# Patient Record
Sex: FEMALE | URBAN - NONMETROPOLITAN AREA
[De-identification: names, ages, dates, MRNs, and addresses within clinical notes are randomized per-mention and may not be internally consistent; named-entity substitution may affect disease eponyms.]

---

## 2023-10-04 ENCOUNTER — APPOINTMENT (OUTPATIENT)
Dept: URBAN - NONMETROPOLITAN AREA CLINIC 47 | Age: 23
Setting detail: DERMATOLOGY
End: 2023-10-11

## 2023-10-04 DIAGNOSIS — L259 CONTACT DERMATITIS AND OTHER ECZEMA, UNSPECIFIED CAUSE: ICD-10-CM

## 2023-10-04 PROBLEM — L23.9 ALLERGIC CONTACT DERMATITIS, UNSPECIFIED CAUSE: Status: ACTIVE | Noted: 2023-10-04

## 2023-10-04 PROCEDURE — OTHER COUNSELING: OTHER

## 2023-10-04 PROCEDURE — OTHER PRESCRIPTION MEDICATION MANAGEMENT: OTHER

## 2023-10-04 PROCEDURE — OTHER PRESCRIPTION: OTHER

## 2023-10-04 PROCEDURE — 99203 OFFICE O/P NEW LOW 30 MIN: CPT

## 2023-10-04 RX ORDER — TRIAMCINOLONE ACETONIDE 1 MG/G
CREAM TOPICAL
Qty: 80 | Refills: 1 | Status: ERX | COMMUNITY
Start: 2023-10-04

## 2023-10-04 ASSESSMENT — LOCATION DETAILED DESCRIPTION DERM
LOCATION DETAILED: LEFT DISTAL PRETIBIAL REGION
LOCATION DETAILED: RIGHT INFERIOR ANTERIOR NECK
LOCATION DETAILED: UPPER STERNUM
LOCATION DETAILED: LEFT AXILLARY VAULT
LOCATION DETAILED: RIGHT ANTERIOR AXILLA

## 2023-10-04 ASSESSMENT — LOCATION SIMPLE DESCRIPTION DERM
LOCATION SIMPLE: CHEST
LOCATION SIMPLE: RIGHT ANTERIOR NECK
LOCATION SIMPLE: RIGHT ANTERIOR AXILLA
LOCATION SIMPLE: LEFT PRETIBIAL REGION
LOCATION SIMPLE: LEFT AXILLARY VAULT

## 2023-10-04 ASSESSMENT — LOCATION ZONE DERM
LOCATION ZONE: AXILLAE
LOCATION ZONE: NECK
LOCATION ZONE: TRUNK
LOCATION ZONE: LEG

## 2023-10-04 NOTE — PROCEDURE: PRESCRIPTION MEDICATION MANAGEMENT
Render In Strict Bullet Format?: No
Initiate Treatment: Claritin 10mg QAM\\nBenadryl 25mg QHS\\nTriamcinolone 0.1% cream BID for 1-2 weeks
Discontinue Regimen: Detergent and spray deodorant
Detail Level: Zone

## 2023-11-08 ENCOUNTER — APPOINTMENT (OUTPATIENT)
Dept: URBAN - NONMETROPOLITAN AREA CLINIC 47 | Age: 23
Setting detail: DERMATOLOGY
End: 2023-11-14

## 2023-11-08 DIAGNOSIS — L259 CONTACT DERMATITIS AND OTHER ECZEMA, UNSPECIFIED CAUSE: ICD-10-CM

## 2023-11-08 PROBLEM — L23.9 ALLERGIC CONTACT DERMATITIS, UNSPECIFIED CAUSE: Status: ACTIVE | Noted: 2023-11-08

## 2023-11-08 PROCEDURE — OTHER COUNSELING: OTHER

## 2023-11-08 PROCEDURE — 99213 OFFICE O/P EST LOW 20 MIN: CPT

## 2023-11-08 PROCEDURE — OTHER PRESCRIPTION: OTHER

## 2023-11-08 PROCEDURE — OTHER PRESCRIPTION MEDICATION MANAGEMENT: OTHER

## 2023-11-08 RX ORDER — CLOBETASOL PROPIONATE 0.5 MG/G
OINTMENT TOPICAL
Qty: 60 | Refills: 3 | Status: ERX | COMMUNITY
Start: 2023-11-08

## 2023-11-08 ASSESSMENT — LOCATION ZONE DERM: LOCATION ZONE: AXILLAE

## 2023-11-08 ASSESSMENT — LOCATION SIMPLE DESCRIPTION DERM: LOCATION SIMPLE: LEFT POSTERIOR AXILLA

## 2023-11-08 ASSESSMENT — LOCATION DETAILED DESCRIPTION DERM: LOCATION DETAILED: LEFT POSTERIOR AXILLA

## 2023-11-08 NOTE — PROCEDURE: PRESCRIPTION MEDICATION MANAGEMENT
Plan: Can consider patch testing if symptoms persist
Discontinue Regimen: Triamcinolone 0.1% cream
Initiate Treatment: Clobetasol 0.05% ointment BID for 2-3 weeks
Detail Level: Zone
Render In Strict Bullet Format?: No

## 2023-12-29 ENCOUNTER — APPOINTMENT (OUTPATIENT)
Dept: URBAN - NONMETROPOLITAN AREA CLINIC 47 | Age: 23
Setting detail: DERMATOLOGY
End: 2023-12-29

## 2023-12-29 DIAGNOSIS — L259 CONTACT DERMATITIS AND OTHER ECZEMA, UNSPECIFIED CAUSE: ICD-10-CM

## 2023-12-29 PROBLEM — L23.9 ALLERGIC CONTACT DERMATITIS, UNSPECIFIED CAUSE: Status: ACTIVE | Noted: 2023-12-29

## 2023-12-29 PROCEDURE — OTHER ORDER FOR PATCH TESTING: OTHER

## 2023-12-29 PROCEDURE — OTHER PRESCRIPTION MEDICATION MANAGEMENT: OTHER

## 2023-12-29 PROCEDURE — OTHER COUNSELING: OTHER

## 2023-12-29 PROCEDURE — 99213 OFFICE O/P EST LOW 20 MIN: CPT

## 2023-12-29 ASSESSMENT — LOCATION DETAILED DESCRIPTION DERM
LOCATION DETAILED: RIGHT DISTAL PRETIBIAL REGION
LOCATION DETAILED: RIGHT RIB CAGE
LOCATION DETAILED: RIGHT MEDIAL SUPERIOR CHEST
LOCATION DETAILED: RIGHT INFERIOR ANTERIOR NECK
LOCATION DETAILED: LEFT INFERIOR ANTERIOR NECK
LOCATION DETAILED: LEFT ANTERIOR MEDIAL PROXIMAL UPPER ARM
LOCATION DETAILED: RIGHT AXILLARY TAIL OF BREAST
LOCATION DETAILED: LEFT MEDIAL SUPERIOR CHEST

## 2023-12-29 ASSESSMENT — LOCATION SIMPLE DESCRIPTION DERM
LOCATION SIMPLE: RIGHT ANTERIOR NECK
LOCATION SIMPLE: LEFT UPPER ARM
LOCATION SIMPLE: ABDOMEN
LOCATION SIMPLE: RIGHT PRETIBIAL REGION
LOCATION SIMPLE: LEFT ANTERIOR NECK
LOCATION SIMPLE: RIGHT BREAST
LOCATION SIMPLE: CHEST

## 2023-12-29 ASSESSMENT — LOCATION ZONE DERM
LOCATION ZONE: NECK
LOCATION ZONE: LEG
LOCATION ZONE: TRUNK
LOCATION ZONE: ARM

## 2023-12-29 ASSESSMENT — ITCH NUMERIC RATING SCALE: HOW SEVERE IS YOUR ITCHING?: 8

## 2023-12-29 ASSESSMENT — SEVERITY ASSESSMENT: SEVERITY: MILD TO MODERATE

## 2023-12-29 ASSESSMENT — BSA RASH: BSA RASH: 6

## 2023-12-29 NOTE — PROCEDURE: ORDER FOR PATCH TESTING
Patch Test To Be Applied: Core ACDS Recommended Series
Location Patches Should Be Applied: Back
Counseling: I discussed the timing of the procedure and ensured the patient understands that this test requires multiple visits. No topical steroids applied should be applied to the patch testing location and no oral prednisone for two week prior to the test. While the patches are in place they should be kept dry which will limit bathing, swimming an exercise. I also explained that it is common for testing to be negative and this doesn't mean there isn't a allergic reaction occurring. During the testing itching is common.
Detail Level: Simple
Patch Test Reading Schedule: First Reading at 48 hours and Second Reading at 96 hours

## 2023-12-29 NOTE — PROCEDURE: PRESCRIPTION MEDICATION MANAGEMENT
Detail Level: Zone
Continue Regimen: Clobetasol 0.05% ointment to affected areas, avoid axilla.
Render In Strict Bullet Format?: No

## 2024-01-29 ENCOUNTER — APPOINTMENT (OUTPATIENT)
Dept: URBAN - METROPOLITAN AREA CLINIC 216 | Age: 24
Setting detail: DERMATOLOGY
End: 2024-01-29

## 2024-01-29 DIAGNOSIS — L259 CONTACT DERMATITIS AND OTHER ECZEMA, UNSPECIFIED CAUSE: ICD-10-CM

## 2024-01-29 PROBLEM — L23.9 ALLERGIC CONTACT DERMATITIS, UNSPECIFIED CAUSE: Status: ACTIVE | Noted: 2024-01-29

## 2024-01-29 PROCEDURE — 95044 PATCH/APPLICATION TESTS: CPT

## 2024-01-29 PROCEDURE — OTHER MIPS QUALITY: OTHER

## 2024-01-29 PROCEDURE — OTHER PATCH TESTING: OTHER

## 2024-01-29 PROCEDURE — OTHER SMOKING CESSATION COUNSELING: OTHER

## 2024-01-29 NOTE — PROCEDURE: SMOKING CESSATION COUNSELING
Counseling Text: Counseled patient to stop smoking and explained that smoking cessation would improve overall health and skin.
Detail Level: Simple
Time Spent Counseling (Min): 1
I will SWITCH the dose or number of times a day I take the medications listed below when I get home from the hospital:  None

## 2024-01-29 NOTE — PROCEDURE: PATCH TESTING
Consent: Written consent obtained, risks reviewed including but not limited to rash, itching, allergic reaction, post-inflammatory pigmentary changes, systemic rash, remote possibility of anaphylaxis to allergen.
Post-Care Instructions: I reviewed with the patient in detail post-care instructions. Patient should not sweat, pick at, or get the patches wet for 48 hours.
Detail Level: None
Number Of Patches (Maximum Allowable Per Dos By Cms Is 90): 88

## 2024-01-31 ENCOUNTER — APPOINTMENT (OUTPATIENT)
Dept: URBAN - METROPOLITAN AREA CLINIC 216 | Age: 24
Setting detail: DERMATOLOGY
End: 2024-02-01

## 2024-01-31 DIAGNOSIS — L259 CONTACT DERMATITIS AND OTHER ECZEMA, UNSPECIFIED CAUSE: ICD-10-CM

## 2024-01-31 PROBLEM — L23.9 ALLERGIC CONTACT DERMATITIS, UNSPECIFIED CAUSE: Status: ACTIVE | Noted: 2024-01-31

## 2024-01-31 PROCEDURE — OTHER SMOKING CESSATION COUNSELING: OTHER

## 2024-01-31 PROCEDURE — OTHER PATCH TEST REMOVAL: OTHER

## 2024-01-31 PROCEDURE — OTHER CORE ACDS PATCH TEST READING: OTHER

## 2024-01-31 PROCEDURE — 99024 POSTOP FOLLOW-UP VISIT: CPT

## 2024-01-31 PROCEDURE — OTHER MIPS QUALITY: OTHER

## 2024-01-31 NOTE — PROCEDURE: CORE ACDS PATCH TEST READING
Name Of Allergen 84: Disperse yellow 3% pet
Allergen 104 Reaction: no reaction
Name Of Allergen 51: Tea tree oil 5% pet
Name Of Allergen 40: Cocamidopropyl betaine 1% aq
Name Of Allergen 73: Amidoamine 0.1% aq
Name Of Allergen 71: Triamcinolone 1% pet
Name Of Allergen 22: Mercapto mix 1% pet
Name Of Allergen 35: Disperse Blue 106/124 mix 1% pet
Name Of Allergen 36: Lidocaine 15% pet
Name Of Allergen 85: Beronica absolute 2% pet
Name Of Allergen 21: Formaldehhyde 1% aq
Name Of Allergen 8: Paraben mix 12% pet
Name Of Allergen 52: Chlorhexidine digluconate 0.5% aq
Name Of Allergen 61: 2-hydroxy-4-methoxybenzophenone-5-sulfonic acid (benzophenone-4) 10% pet
Name Of Allergen 83: Benzyl salicylate 1% pet
Name Of Allergen 9: Methylisothiazolinone 0.2% aq
Name Of Allergen 45: Decyl glucoside 5% pet
Name Of Allergen 12: Cobalt chloride 1% pet
Name Of Allergen 44: Oleamidopropyl dimethylamine 0.1% aq
Name Of Allergen 74: Ethyl cyanoacrylate 10% pet
Name Of Allergen 33: Bacitracin 20% pet
Name Of Allergen 5: DMDM Hydantoin 1.0% pet
Name Of Allergen 78: Butylhydroxytolulene (BHT) 2% pet
Name Of Allergen 25: Diazolidinyl urea 1% pet
Name Of Allergen 1: Nickel sulfate 2.5% pet
Name Of Allergen 53: Propolis 10% pet
Name Of Allergen 18: Quaternium-15 2% pet
Name Of Allergen 7: Colophony 20% pet
Name Of Allergen 86: Mentha piperita oil 2% (peppermint oil)
Name Of Allergen 14: Bisphenol A epoxy resin 1% pet
What Reading Time Point?: 48 hour
Name Of Allergen 17: Methylchloroisothiazolinone/methylisothiazolinone 100ppm aq
Name Of Allergen 55: 0-hatifkp5-gdgszsiyegxemgapqax (Benzophenone 3) 10% pet
Name Of Allergen 69: 4-chloro-3-cresol 1% pet
Name Of Allergen 87: Pramoxine hydrochloride
Name Of Allergen 50: Ethyl acrylate 0.1% pet
Name Of Allergen 16: Black rubber mix 0.6% pet
Name Of Allergen 59: Hydroxyperoxides of limonene 2% pet
Name Of Allergen 48: Cinnamic aldehyde 1% pet
Name Of Allergen 42: 3-(dimethylamino) propylamine (DMAPA) 1% aq
Name Of Allergen 39: Polymyxin B sulfate 3% pet
Name Of Allergen 47: Lavender absolute 2% pet
Name Of Allergen 20: p-phenylenediamine 1% pet
Name Of Allergen 67: Sorbitan sesquioleate 20% pet
Name Of Allergen 79: 2-ethylhexyl-4-methoxycinnamate 10% pet (octinoxate)
Name Of Allergen 34: Fragrance mix II 14% pet
Name Of Allergen 37: Popylene glycol 30%
Name Of Allergen 49: D/L-alpha-tocopherol 100%
Name Of Allergen 70: Ethyl hexyl glycerol 5%
Name Of Allergen 77: Benzoic acid 5% pet
Name Of Allergen 88: Shellac 20% ethanol
Name Of Allergen 27: Tixocortol-21-pivalate 1% pet
Name Of Allergen 10: Balsam of [place] (Myroxylon pereirae) 25% pet
Name Of Allergen 60: Benzalkonium chloride 0.1% pet
Name Of Allergen 19: Methyldibromoglutaronitrile 0.5%
Show Allergen Counseling In The Note?: No
Name Of Allergen 29: Imidazolidinyl urea 2% pet
Name Of Allergen 81: Cetyl Steryl alcohol 20% pet
Name Of Allergen 28: Gold sodium thiosulfate 2% pet
Name Of Allergen 82: [place] 2.5% pet
Name Of Allergen 75: Phenoxyethanol 1% pet
Name Of Allergen 46: Methyl methacyrlate 2% pet
Name Of Allergen 63: Sorbic acid 2% pet
Name Of Allergen 23: 2-Bromo-2-nitropropane 1,3-diol 0.5% pet
Name Of Allergen 24: Thiuram mix 3% pet
Name Of Allergen 89: na
Name Of Allergen 6: Fragrance mix I 8.0% pet
Name Of Allergen 72: Clobetasol-17-proprionate 1%
Name Of Allergen 56: Tosylamide formaldehyde resin 10% pet
Name Of Allergen 54: 4-chloro-3,5-xylenon (PCMX) 1% pet
Name Of Allergen 41: Mixed dialkyl thioureas 1% pet
Name Of Allergen 76: Disperse orange 3  1% pet
Name Of Allergen 80: Benzyl alcohol 10% pet
Number Of Patches Read: 88
Name Of Allergen 30: Budesonide 0.1% pet
Detail Level: Zone
Name Of Allergen 2: LAnolin alcohol (Amerchol 101) 50% pet
Name Of Allergen 66: Ethylemeurea melamine -formaldehyde 5% pet
Name Of Allergen 4: Potassium dichromate 0.25% pet
Name Of Allergen 26: Benzocaine 5% pet
Show Negative Results In The Note?: Yes
Name Of Allergen 65: Compositae mix 6% pet
Name Of Allergen 13: v-ugby-tdpgutgjnjk formaldehyde resin 1% pet
Name Of Allergen 3: Neomycin 20% pet
Name Of Allergen 43: 2-hydroxyethyl methacrylate 2% pet
Name Of Allergen 64: Massimo martinez 2% pet (Cananga odorata)
Name Of Allergen 58: Cocamide JOEL 0.5%
Name Of Allergen 38: Isopropynyl butylcarbamate 0.1% pet
Name Of Allergen 31: Hydrocortisone-17-butyrate 1% pet
Name Of Allergen 57: Sesquiterpene lactone mix 0.1% pet
Name Of Allergen 11: Ethylenediamine dihydrochloride 1%
Name Of Allergen 68: 1,3-diphenylguanidine (DPG)
Name Of Allergen 32: 2-Mercaptobenzothiazole 1% pet
Name Of Allergen 15: Carba mix 3% pet
Name Of Allergen 62: Sodium benzoate 5% pet

## 2024-02-02 ENCOUNTER — APPOINTMENT (OUTPATIENT)
Dept: URBAN - METROPOLITAN AREA CLINIC 216 | Age: 24
Setting detail: DERMATOLOGY
End: 2024-02-02

## 2024-02-02 DIAGNOSIS — L259 CONTACT DERMATITIS AND OTHER ECZEMA, UNSPECIFIED CAUSE: ICD-10-CM

## 2024-02-02 PROBLEM — L23.0 ALLERGIC CONTACT DERMATITIS DUE TO METALS: Status: ACTIVE | Noted: 2024-02-02

## 2024-02-02 PROCEDURE — OTHER COUNSELING ALLERGENS: OTHER

## 2024-02-02 PROCEDURE — OTHER CORE ACDS PATCH TEST READING: OTHER

## 2024-02-02 PROCEDURE — OTHER SMOKING CESSATION COUNSELING: OTHER

## 2024-02-02 PROCEDURE — 99212 OFFICE O/P EST SF 10 MIN: CPT

## 2024-02-02 PROCEDURE — OTHER MIPS QUALITY: OTHER

## 2024-02-02 NOTE — PROCEDURE: CORE ACDS PATCH TEST READING
Allergen 40 Reaction: no reaction
Name Of Allergen 73: Amidoamine 0.1% aq
Name Of Allergen 57: Sesquiterpene lactone mix 0.1% pet
Name Of Allergen 54: 4-chloro-3,5-xylenon (PCMX) 1% pet
Show Allergen Counseling In The Note?: No
Name Of Allergen 61: 2-hydroxy-4-methoxybenzophenone-5-sulfonic acid (benzophenone-4) 10% pet
Name Of Allergen 16: Black rubber mix 0.6% pet
What Reading Time Point?: 96 hour
Allergen 53 Reaction: +/-
Name Of Allergen 85: Beronica absolute 2% pet
Name Of Allergen 28: Gold sodium thiosulfate 2% pet
Name Of Allergen 38: Isopropynyl butylcarbamate 0.1% pet
Number Of Patches Read: 88
Name Of Allergen 49: D/L-alpha-tocopherol 100%
Name Of Allergen 83: Benzyl salicylate 1% pet
Name Of Allergen 11: Ethylenediamine dihydrochloride 1%
Name Of Allergen 14: Bisphenol A epoxy resin 1% pet
72.94
Name Of Allergen 45: Decyl glucoside 5% pet
Name Of Allergen 52: Chlorhexidine digluconate 0.5% aq
Name Of Allergen 35: Disperse Blue 106/124 mix 1% pet
Name Of Allergen 67: Sorbitan sesquioleate 20% pet
Name Of Allergen 32: 2-Mercaptobenzothiazole 1% pet
Name Of Allergen 18: Quaternium-15 2% pet
Name Of Allergen 20: p-phenylenediamine 1% pet
Name Of Allergen 82: [place] 2.5% pet
Name Of Allergen 56: Tosylamide formaldehyde resin 10% pet
Name Of Allergen 17: Methylchloroisothiazolinone/methylisothiazolinone 100ppm aq
Allergen 1 Reaction: 1+
Name Of Allergen 80: Benzyl alcohol 10% pet
Name Of Allergen 59: Hydroxyperoxides of limonene 2% pet
Name Of Allergen 9: Methylisothiazolinone 0.2% aq
Name Of Allergen 48: Cinnamic aldehyde 1% pet
Name Of Allergen 77: Benzoic acid 5% pet
Name Of Allergen 15: Carba mix 3% pet
Name Of Allergen 55: 2-oltwiyo9-ocsmldvysfefgsserqu (Benzophenone 3) 10% pet
Name Of Allergen 19: Methyldibromoglutaronitrile 0.5%
Name Of Allergen 42: 3-(dimethylamino) propylamine (DMAPA) 1% aq
Name Of Allergen 58: Cocamide JOEL 0.5%
Name Of Allergen 34: Fragrance mix II 14% pet
Name Of Allergen 46: Methyl methacyrlate 2% pet
Name Of Allergen 8: Paraben mix 12% pet
Name Of Allergen 12: Cobalt chloride 1% pet
Name Of Allergen 13: i-gqvt-cloiwxmojis formaldehyde resin 1% pet
Name Of Allergen 87: Parmoxine hydrochloride 2% pet
Name Of Allergen 79: 2-ethylhexyl-4-methoxycinnamate 10% pet (octinoxate)
Name Of Allergen 84: Disperse yellow 3% pet
Name Of Allergen 44: Oleamidopropyl dimethylamine 0.1% aq
Name Of Allergen 40: Cocamidopropyl betaine 1% aq
Name Of Allergen 33: Bacitracin 20% pet
Name Of Allergen 41: Mixed dialkyl thioureas 1% pet
Name Of Allergen 71: Triamcinolone 1% pet
Name Of Allergen 69: 4-chloro-3-cresol 1% pet
Name Of Allergen 51: Tea tree oil 5% pet
Name Of Allergen 21: Formaldehhyde 1% aq
Name Of Allergen 10: Balsam of [place] (Myroxylon pereirae) 25% pet
Name Of Allergen 2: LAnolin alcohol (Amerchol 101) 50% pet
Name Of Allergen 4: Potassium dichromate 0.25% pet
Name Of Allergen 6: Fragrance mix I 8.0% pet
Name Of Allergen 24: Thiuram mix 3% pet
Name Of Allergen 62: Sodium benzoate 5% pet
Name Of Allergen 23: 2-Bromo-2-nitropropane 1,3-diol 0.5% pet
Name Of Allergen 63: Sorbic acid 2% pet
Name Of Allergen 7: Colophony 20% pet
Name Of Allergen 81: Cetyl Steryl alcohol 20% pet
Name Of Allergen 75: Phenoxyethanol 1% pet
Name Of Allergen 27: Tixocortol-21-pivalate 1% pet
Name Of Allergen 64: Massimo martinez 2% pet ([place] odorata)
Name Of Allergen 30: Budesonide 0.1% pet
Name Of Allergen 47: Lavender absolute 2% pet
Name Of Allergen 37: Popylene glycol 30%
Name Of Allergen 86: Mentha piperita oil 2% (peppermint oil)
Name Of Allergen 39: Polymyxin B sulfate 3% pet
Name Of Allergen 74: Ethyl cyanoacrylate 10% pet
Name Of Allergen 66: Ethylemeurea melamine -formaldehyde 5% pet
Name Of Allergen 3: Neomycin 20% pet
Name Of Allergen 22: Mercapto mix 1% pet
Name Of Allergen 36: Lidocaine 15% pet
Name Of Allergen 60: Benzalkonium chloride 0.1% pet
Name Of Allergen 68: 1,3-diphenylguanidine (DPG)
Name Of Allergen 65: Compositae mix 6% pet
Name Of Allergen 88: Shellac 20% ethanol
Name Of Allergen 43: 2-hydroxyethyl methacrylate 2% pet
Name Of Allergen 76: Disperse orange 3  1% pet
Name Of Allergen 26: Benzocaine 5% pet
Name Of Allergen 50: Ethyl acrylate 0.1% pet
Name Of Allergen 78: Butylhydroxytolulene (BHT) 2% pet
Name Of Allergen 70: Ethyl hexyl glycerol 5%
Name Of Allergen 53: Propolis 10% pet
Name Of Allergen 5: DMDM Hydantoin 1.0% pet
Name Of Allergen 1: Nickel sulfate 2.5% pet
Show Negative Results In The Note?: Yes
Name Of Allergen 31: Hydrocortisone-17-butyrate 1% pet
Name Of Allergen 89: Lauryl glycoside 3% pet
Name Of Allergen 29: Imidazolidinyl urea 2% pet
Detail Level: Zone
Name Of Allergen 72: Clobetasol-17-proprionate 1%
Name Of Allergen 25: Diazolidinyl urea 1% pet